# Patient Record
Sex: MALE | Race: WHITE
[De-identification: names, ages, dates, MRNs, and addresses within clinical notes are randomized per-mention and may not be internally consistent; named-entity substitution may affect disease eponyms.]

---

## 2018-09-27 ENCOUNTER — HOSPITAL ENCOUNTER (INPATIENT)
Dept: HOSPITAL 74 - YASAS | Age: 48
LOS: 4 days | Discharge: HOME | DRG: 897 | End: 2018-10-01
Attending: INTERNAL MEDICINE | Admitting: INTERNAL MEDICINE
Payer: COMMERCIAL

## 2018-09-27 VITALS — BODY MASS INDEX: 25.6 KG/M2

## 2018-09-27 DIAGNOSIS — F10.230: Primary | ICD-10-CM

## 2018-09-27 DIAGNOSIS — H15.89: ICD-10-CM

## 2018-09-27 DIAGNOSIS — I11.0: ICD-10-CM

## 2018-09-27 DIAGNOSIS — I50.9: ICD-10-CM

## 2018-09-27 DIAGNOSIS — R00.0: ICD-10-CM

## 2018-09-27 DIAGNOSIS — E72.20: ICD-10-CM

## 2018-09-27 DIAGNOSIS — G47.00: ICD-10-CM

## 2018-09-27 DIAGNOSIS — D64.9: ICD-10-CM

## 2018-09-27 DIAGNOSIS — F17.210: ICD-10-CM

## 2018-09-27 DIAGNOSIS — Z95.810: ICD-10-CM

## 2018-09-27 DIAGNOSIS — K80.20: ICD-10-CM

## 2018-09-27 DIAGNOSIS — K76.0: ICD-10-CM

## 2018-09-27 DIAGNOSIS — F10.24: ICD-10-CM

## 2018-09-27 PROCEDURE — HZ2ZZZZ DETOXIFICATION SERVICES FOR SUBSTANCE ABUSE TREATMENT: ICD-10-PCS | Performed by: INTERNAL MEDICINE

## 2018-09-27 RX ADMIN — SOTALOL HYDROCHLORIDE SCH MG: 80 TABLET ORAL at 22:24

## 2018-09-27 RX ADMIN — Medication SCH MG: at 22:24

## 2018-09-27 NOTE — PN
[facility] Progress Note


Note: 





 Vital Signs











Temperature  97.3 F L  09/27/18 19:17


 


Pulse Rate  131 H  09/27/18 19:17


 


Respiratory Rate  18   09/27/18 19:17


 


Blood Pressure  111/80   09/27/18 19:17


 


O2 Sat by Pulse Oximetry (%)      








c/o of nausea and vomiting 


zofran prn 


fluids as tolerated 


repeat v/s 


continue to monitor

## 2018-09-27 NOTE — HP
CIWA Score





- CIWA Score


Nausea/Vomitin-Cont. Nausea/Vomiting


Muscle Tremors: 4-Moderate,w/Arms Extend


Anxiety: 4-Mod. Anxious/Guarded


Agitation: 4-Moderately Restless


Paroxysmal Sweats: 1-Minimal Palms Moist (Moisture on face)


Orientation: 1-Uncertain about Date


Tacttile Disturbances: 0-None


Auditory Disturbances: 0-None


Visual Disturbances: 0-None


Headache: 0-None Present


CIWA-Ar Total Score: 21





Admission ROS S





- HPI


Chief Complaint: 





Here for alcohol withdrawal.


Allergies/Adverse Reactions: 


 Allergies











Allergy/AdvReac Type Severity Reaction Status Date / Time


 


No Known Allergies Allergy   Verified 18 15:04











History of Present Illness: 


First treatment episode for alcohol use for this 49 yo male.  Alcohol use began 

at age 13 and worsened since  till now drinks 1-2 pints daily. Last drink 

was 2 days ago. 








Seen in [facility] ER from    - 18. Patient was discharged and brought 

directly to [facility] by ambulette. 





Ethanol level: 405 (Rxd w/ IV Librium)


Bilirubin 2.9; Conjugated 2.0;


Alk: 491     ALT: 83        AST: 258


HGB: 9.1;       HCT:  27.3


PT/INR:    12.7/1.3  - Not on anticoagulants


Magnesium:  1.5mg/dl


Na: 130    K+: 3.4   Cl: 91;   





Dx: 


Cholelithiasis; Hepatic steatosis; 


Fatty liver  w/o cirrhosis;  


Systolic Heart Failure: (Internal defibrillator placed on 10/2015 which 

overlies the Left Heart)


Ventricular tachycardia


Hx pericardial effusion w/ cardiac tamponade. 





Current meds:


Lopressor 12.5 mg PO BID


Sotalol 80 mg PO Daily


Lisinoprol 2.5 mg PO Daily


Torsemide 20 mg for symptoms of overload (3x/wk)


Folic Acid 1 mg PO Daily


ASA 81 mg PO Daily


MVI  1 tab PO Daily


Thiamine 100 mg PO Daily


  





Exam Limitations: No Limitations





- Ebola screening


Have you traveled outside of the country in the last 21 days: No


Have you had contact with anyone from an Ebola affected area: No


Have you been sick,other than usual withdrawal symptoms: No


Do you have a fever: No





- Review of Systems


Constitutional: Chills


EENT: reports: Blurred Vision


Respiratory: reports: SOB with Exertion (When walking up stairs)


Cardiac: reports: Irregular Heart Rate, Other (Defibrillator. Denies chest pain.

)


GI: reports: Nausea, Tarry Stools (Tarry stools in past few months.)


: reports: No Symptoms Reported


Musculoskeletal: reports: Muscle Weakness (both legs)


Integumentary: reports: Rash (Posriasis on legs)


Endocrine: reports: No Symptoms Reported


Hematology: reports: Anemia


Psychiatric: reports: Judgement Intact, Agitated, Anxious, Depressed (Denies 

thoughts of harming self or others)





Patient History





- Patient Medical History


Hx Anemia: Yes


Hx Asthma: No


Hx Chronic Obstructive Pulmonary Disease (COPD): No


Hx Cardiac Disorders: Yes (pericardial effusion cardiac tamponade)


Hx Hypertension: Yes


Hx Hypercholesterolemia: No


Hx Pacemaker: Yes (Internal Defibrillator)


Hx Seizures: No


Hx Diabetes: No


Hx Gastrointestinal Disorders: Yes (Gallbladder problems)


Hx Liver Disease: Yes (fatty liver, )


Hx Genitourinary Disorders: No


Hx Sexually Transmitted Disorders: No (Denies)


Hx Renal Disease (ESRD): No


Hx Human Immunodeficiency Virus (HIV): No (Neg )


Hx Hepatitis C: No


Hx Depression: Yes


Hx Suicide Attempt: No


Hx Schizophrenia: No





- Patient Surgical History


Past Surgical History: Yes


Hx Neurologic Surgery: No


Hx Cataract Extraction: No


Hx Cardiac Surgery: Yes (defibrillator 10/2015)


Hx Lung Surgery: No


Hx Breast Surgery: No


Hx Breast Biopsy: No


Hx Abdominal Surgery: No


Hx Appendectomy: No


Hx Cholecystectomy: No


Hx Genitourinary Surgery: No


Hx  Section: No


Hx Orthopedic Surgery: No


Other Surgical History: Chest sx in 2017


Anesthesia Reaction: No





- PPD History


Previous Implant?: Yes


Documented Results: Negative w/o proof


Implanted On Prior [facility] Admission?: No





- Smoking Cessation


Smoking history: Current every day smoker


Have you smoked in the past 12 months: Yes


Aproximately how many cigarettes per day: 4


Hx Chewing Tobacco Use: No


Initiated information on smoking cessation: Yes


'Breaking Loose' booklet given: 18





- Substance & Tx. History


Hx Alcohol Use: Yes


Hx Substance Use: Yes


Substance Use Type: Alcohol


Hx Substance Use Treatment: No





- Substances Abused


  ** Alcohol


Route: Oral


Frequency: Daily


Amount used: 1-2 pints rum


Age of first use: 13


Date of Last Use: 18





Admission Physical Exam [facility]





- Vital Signs


Vital Signs: 


 Vital Signs - 24 hr











  18





  14:42


 


Temperature 97.5 F L


 


Pulse Rate 120 H


 


Respiratory 19





Rate 


 


Blood Pressure 110/76














- Physical


General Appearance: Yes: Appropriately Dressed, Moderate Distress, Tremorous, 

Sweating, Anxious


HEENTM: Yes: EOMI, SAMIA, Scleral Ictenus R (Slight jaundice sclera and 

conjunctiva), Scleral Ictenus L (Slight jaundice sclera and conjunctiva)


Respiratory: Yes: Chest Non-Tender, Lungs Clear, Normal Breath Sounds, No 

Respiratory Distress


Neck: Yes: No masses,lesions,Nodules, Supple


Breast: Yes: Breast Exam Deferred


Cardiology: Yes: Regular Rhythm, Murmur, Tachycardia, Surgical Scar, Other (

Internal defibrillator (L) chest area. Denies chest pain. No pedal edema.)


Abdominal: Yes: Non Tender, Soft, Increased Bowel Sounds


Genitourinary: Yes: Within Normal Limits


Back: Yes: Normal Inspection


Musculoskeletal: Yes: full range of Motion, Other (Gait slow and slightly 

insteady.)


Extremities: Yes: Normal Capillary Refill, Normal Range of Motion, Non-Tender, 

Tremors (Tremors of hands at rest and increases w/ arm elevation)


Integumentary: Yes: Normal Color, Warm, Rash (BLE at calf area.)


Lymphatic: Yes: Within Normal Limits





- Diagnostic


(1) Alcohol dependence with withdrawal


Current Visit: Yes   Status: Acute   


Qualifiers: 


   Complication of substance-induced condition: uncomplicated   Qualified Code(s

): F10.230 - Alcohol dependence with withdrawal, uncomplicated   





(2) Heart failure, unspecified


Current Visit: Yes   Status: Chronic   


Qualifiers: 


   Heart failure type: unspecified   Heart failure chronicity: unspecified   

Qualified Code(s): I50.9 - Heart failure, unspecified   





(3) Other disorders of sclera


Current Visit: Yes   Status: Chronic   Comment: Icteric sclera   





(4) Calculus of gallbladder without cholecystitis without obstruction


Current Visit: Yes   Status: Chronic   





(5) Presence of automatic (implantable) cardiac defibrillator


Current Visit: Yes   Status: Chronic   Comment: (L) chest area.    





(6) Tachycardia, unspecified


Current Visit: Yes   Status: Chronic   





Cleared for Admission [facility]





- Detox or Rehab


[facility] Level of Care: Medically Managed


Detox Regimen/Protocol: Librium





[facility] Breath Alcohol Content


Breath Alcohol Content: 0.100





Urine Drug Screen





- Results


Drug Screen Negative: No


Urine Drug Screen Results: BZO-Benzodiazepines

## 2018-09-28 LAB
ALBUMIN SERPL-MCNC: 2.1 G/DL (ref 3.4–5)
ALP SERPL-CCNC: 671 U/L (ref 45–117)
ALT SERPL-CCNC: 106 U/L (ref 13–61)
ANION GAP SERPL CALC-SCNC: 18 MMOL/L (ref 8–16)
AST SERPL-CCNC: 341 U/L (ref 15–37)
BILIRUB SERPL-MCNC: 3.1 MG/DL (ref 0.2–1)
BUN SERPL-MCNC: 3 MG/DL (ref 7–18)
CALCIUM SERPL-MCNC: 8.7 MG/DL (ref 8.5–10.1)
CHLORIDE SERPL-SCNC: 97 MMOL/L (ref 98–107)
CO2 SERPL-SCNC: 20 MMOL/L (ref 21–32)
CREAT SERPL-MCNC: 0.8 MG/DL (ref 0.55–1.3)
DEPRECATED RDW RBC AUTO: 15.2 % (ref 11.9–15.9)
GLUCOSE SERPL-MCNC: 119 MG/DL (ref 74–106)
HCT VFR BLD CALC: 31.3 % (ref 35.4–49)
HGB BLD-MCNC: 10.5 GM/DL (ref 11.7–16.9)
MCH RBC QN AUTO: 39.2 PG (ref 25.7–33.7)
MCHC RBC AUTO-ENTMCNC: 33.6 G/DL (ref 32–35.9)
MCV RBC: 116.6 FL (ref 80–96)
PLATELET # BLD AUTO: 197 K/MM3 (ref 134–434)
PMV BLD: 11.6 FL (ref 7.5–11.1)
POTASSIUM SERPLBLD-SCNC: 4.3 MMOL/L (ref 3.5–5.1)
PROT SERPL-MCNC: 8.8 G/DL (ref 6.4–8.2)
RBC # BLD AUTO: 2.69 M/MM3 (ref 4–5.6)
SODIUM SERPL-SCNC: 135 MMOL/L (ref 136–145)
WBC # BLD AUTO: 10.7 K/MM3 (ref 4–10)

## 2018-09-28 RX ADMIN — FOLIC ACID SCH MG: 1 TABLET ORAL at 10:32

## 2018-09-28 RX ADMIN — SOTALOL HYDROCHLORIDE SCH MG: 80 TABLET ORAL at 10:34

## 2018-09-28 RX ADMIN — ASPIRIN 81 MG SCH MG: 81 TABLET ORAL at 10:32

## 2018-09-28 RX ADMIN — Medication SCH TAB: at 10:33

## 2018-09-28 RX ADMIN — Medication SCH MG: at 22:36

## 2018-09-28 RX ADMIN — FERROUS SULFATE TAB EC 324 MG (65 MG FE EQUIVALENT) SCH MG: 324 (65 FE) TABLET DELAYED RESPONSE at 08:58

## 2018-09-28 RX ADMIN — LACTULOSE SCH GM: 20 SOLUTION ORAL at 22:35

## 2018-09-28 RX ADMIN — SOTALOL HYDROCHLORIDE SCH: 80 TABLET ORAL at 23:09

## 2018-09-28 RX ADMIN — FERROUS SULFATE TAB EC 324 MG (65 MG FE EQUIVALENT) SCH MG: 324 (65 FE) TABLET DELAYED RESPONSE at 18:13

## 2018-09-28 NOTE — CONSULT
[facility] Psychiatric Consult





- Data


Date of interview: 09/28/18


Admission source: [facility]


Identifying data: Patient is a 48 year old  male, father of three, 

domiciled, and employed. This is patient's first admission to detox at [facility]. Pt. admitted to  for alcohol dependence.


Substance Abuse History: Smoking Cessation.  Smoking history: Current every day 

smoker.  Have you smoked in the past 12 months: Yes.  Aproximately how many 

cigarettes per day: 4.  Hx Chewing Tobacco Use: No.  Initiated information on 

smoking cessation: Yes.  'Breaking Loose' booklet given: 09/27/18.  - Substance 

& Tx. History.  Hx Alcohol Use: Yes.  Hx Substance Use: Yes.  Substance Use Type

: Alcohol.  Hx Substance Use Treatment: No.  - Substances Abused.  ** Alcohol.  

Route: Oral.  Frequency: Daily.  Amount used: 1-2 pints rum.  Age of first use: 

13.  Date of Last Use: 09/25/18


Medical History: Anemia, pericardial effusion cardiac tamponade, fatty liver, 

Chest surgery (2017), Pace maker (Internal Defibrillator)


Psychiatric History: Patient denies h/o psychiatric hospitalization, outpatient 

care, and suicide attempt. Patient reports poor sleep.


Physical/Sexual Abuse/Trauma History: denies.





Mental Status Exam





- Mental Status Exam


Alert and Oriented to: Time, Place, Person


Cognitive Function: Good


Patient Appearance: Well Groomed


Mood: Euthymic


Affect: Mood Congruent


Patient Behavior: Fatigued, Cooperative


Speech Pattern: Appropriate


Voice Loudness: Moderately Soft/Quiet


Thought Process: Intact, Goal Oriented


Thought Disorder: Not Present


Hallucinations: Denies


Suicidal Ideation: Denies


Homicidal Ideation: Denies


Insight/Judgement: Poor


Sleep: Fair


Appetite: Fair


Muscle strength/Tone: Normal


Gait/Station: Normal





Psychiatric Findings





- Problem List (Axis 1, 2,3)


(1) Alcohol-induced mood disorder


Current Visit: Yes   Status: Acute   





(2) Insomnia


Current Visit: Yes   Status: Acute   





(3) Alcohol dependence with withdrawal


Current Visit: Yes   Status: Acute   


Qualifiers: 


   Complication of substance-induced condition: uncomplicated   Qualified Code(s

): F10.230 - Alcohol dependence with withdrawal, uncomplicated   





- Initial Treatment Plan


Initial Treatment Plan: Psychoeducation provided. Detoxification in progress. 

Pt. made aware that melatonin 5mg is ordered for insomnia.

## 2018-09-28 NOTE — EKG
Test Reason : 

Blood Pressure : ***/*** mmHG

Vent. Rate : 106 BPM     Atrial Rate : 106 BPM

   P-R Int : 124 ms          QRS Dur : 080 ms

    QT Int : 342 ms       P-R-T Axes : 035 -11 052 degrees

   QTc Int : 454 ms

 

*** POOR DATA QUALITY, INTERPRETATION MAY BE ADVERSELY AFFECTED

SINUS TACHYCARDIA

OTHERWISE NORMAL ECG

NO PREVIOUS ECGS AVAILABLE

Confirmed by MARGARITA IRIZARRY MD ([id number]) on 9/28/2018 11:14:43 AM

 

Referred By:             Confirmed By:MARGARITA IRIZARRY MD

## 2018-09-28 NOTE — PN
S CIWA





- CIWA Score


Nausea/Vomitin-No Nausea/No Vomiting


Muscle Tremors: 4-Moderate,w/Arms Extend


Anxiety: 3


Agitation: 3


Paroxysmal Sweats: 3


Orientation: 0-Oriented


Tacttile Disturbances: 0-None


Auditory Disturbances: 0-None


Visual Disturbances: 0-None


Headache: 0-None Present


CIWA-Ar Total Score: 13





[facility] Progress Note (SOAP)


Subjective: 





sweats


irritable


agitation


shakes


interrupted sleep


Objective: 





18 11:06


 Vital Signs











Temperature  98.2 F   18 09:24


 


Pulse Rate  108 H  18 09:24


 


Respiratory Rate  18 09:24


 


Blood Pressure  129/58 L  18 09:24


 


O2 Sat by Pulse Oximetry (%)      








 Laboratory Tests











  18





  06:00 06:00 07:00


 


WBC  10.7 H  


 


RBC  2.69 L  


 


Hgb  10.5 L  


 


Hct  31.3 L  


 


MCV  116.6 H  


 


MCH  39.2 H  


 


MCHC  33.6  


 


RDW  15.2  


 


Plt Count  197  


 


MPV  11.6 H  


 


Sodium   135 L 


 


Potassium   4.3 


 


Chloride   97 L 


 


Carbon Dioxide   20 L 


 


Anion Gap   18 H 


 


BUN   3 L 


 


Creatinine   0.8 


 


Creat Clearance w eGFR   > 60 


 


Random Glucose   119 H 


 


Calcium   8.7 


 


Total Bilirubin   3.1 H 


 


AST   341 H 


 


ALT   106 H 


 


Alkaline Phosphatase   671 H 


 


Ammonia    85.12 H


 


Total Protein   8.8 H 


 


Albumin   2.1 L 








elevated ast/alt/ammonia level


pt is chonic cirrohosis of liver


laculose ordered


repeated labs for .


Assessment: 





18 11:10


withdrawal sx


Plan: 





continue detox


increase fluids


repeated labs pending

## 2018-09-28 NOTE — PN
[facility] Progress Note (SOAP)


Subjective: 





States feels okay. C/o being cold. States having watery diarrhea but is aware it

's r/t medications.


Objective: 


A&Ox 3. Lungs CTA. Abd S/NT.  No pedal edema. 





 Vital Signs - 24 hr











  09/27/18 09/28/18 09/28/18





  22:15 00:30 03:30


 


Temperature 97.7 F  


 


Pulse Rate 128 H  


 


Respiratory 18 18 18





Rate   


 


Blood Pressure 101/77  














  09/28/18 09/28/18 09/28/18





  05:49 09:24 14:29


 


Temperature 98.2 F 98.2 F 96.6 F L


 


Pulse Rate 72 108 H 96 H


 


Respiratory 18 18 16





Rate   


 


Blood Pressure 99/73 129/58 L 90/62











Current B/P: 88/62   HR:84


 CMP











Sodium  135 mmol/L (136-145)  L  09/28/18  06:00    


 


Potassium  4.3 mmol/L (3.5-5.1)   09/28/18  06:00    


 


Chloride  97 mmol/L ()  L  09/28/18  06:00    


 


Carbon Dioxide  20 mmol/L (21-32)  L  09/28/18  06:00    


 


Anion Gap  18 MMOL/L (8-16)  H  09/28/18  06:00    


 


BUN  3 mg/dL (7-18)  L  09/28/18  06:00    


 


Creatinine  0.8 mg/dL (0.55-1.3)   09/28/18  06:00    


 


Creat Clearance w eGFR  > 60  (>60)   09/28/18  06:00    


 


Random Glucose  119 mg/dL ()  H  09/28/18  06:00    


 


Calcium  8.7 mg/dL (8.5-10.1)   09/28/18  06:00    


 


Total Bilirubin  3.1 mg/dL (0.2-1)  H  09/28/18  06:00    


 


AST  341 U/L (15-37)  H  09/28/18  06:00    


 


ALT  106 U/L (13-61)  H  09/28/18  06:00    


 


Alkaline Phosphatase  671 U/L ()  H  09/28/18  06:00    


 


Ammonia  85.12 umol/L (11-32)  H  09/28/18  07:00    


 


Total Protein  8.8 g/dl (6.4-8.2)  H  09/28/18  06:00    


 


Albumin  2.1 g/dl (3.4-5.0)  L  09/28/18  06:00    








Labs reviewed. 


09/28/18 21:48





Assessment: 


Withdrawal symptoms.


Unstableness of B/P


Increased serum ammonia level


Plan: 


Continue detox w/ adjustment to Librium dose as needed.


Encourage increased water intake


Continue lactulose

## 2018-09-29 RX ADMIN — Medication SCH MG: at 22:20

## 2018-09-29 RX ADMIN — FERROUS SULFATE TAB EC 324 MG (65 MG FE EQUIVALENT) SCH: 324 (65 FE) TABLET DELAYED RESPONSE at 10:14

## 2018-09-29 RX ADMIN — LACTULOSE SCH: 20 SOLUTION ORAL at 22:22

## 2018-09-29 RX ADMIN — SOTALOL HYDROCHLORIDE SCH MG: 80 TABLET ORAL at 10:14

## 2018-09-29 RX ADMIN — ASPIRIN 81 MG SCH MG: 81 TABLET ORAL at 10:14

## 2018-09-29 RX ADMIN — FOLIC ACID SCH MG: 1 TABLET ORAL at 10:14

## 2018-09-29 RX ADMIN — SOTALOL HYDROCHLORIDE SCH MG: 80 TABLET ORAL at 22:20

## 2018-09-29 RX ADMIN — FERROUS SULFATE TAB EC 324 MG (65 MG FE EQUIVALENT) SCH MG: 324 (65 FE) TABLET DELAYED RESPONSE at 18:19

## 2018-09-29 RX ADMIN — LACTULOSE SCH GM: 20 SOLUTION ORAL at 10:17

## 2018-09-29 RX ADMIN — Medication SCH TAB: at 10:15

## 2018-09-29 NOTE — PN
[facility] CIWA





- CIWA Score


Nausea/Vomitin-No Nausea/No Vomiting


Muscle Tremors: 3


Anxiety: 3


Agitation: 3


Paroxysmal Sweats: 1-Minimal Palms Moist


Orientation: 0-Oriented


Tacttile Disturbances: 0-None


Auditory Disturbances: 0-None


Visual Disturbances: 0-None


Headache: 0-None Present


CIWA-Ar Total Score: 10





[facility] Progress Note (SOAP)


Subjective: 





tremors, chills, interrupted sleep, anxious


Objective: 





18 13:12


 Vital Signs











Temperature  98.2 F   18 09:00


 


Pulse Rate  124 H  18 09:00


 


Respiratory Rate  20   18 09:00


 


Blood Pressure  90/52 L  18 09:00


 


O2 Sat by Pulse Oximetry (%)      








 Laboratory Last Values











WBC  10.7 K/mm3 (4.0-10.0)  H  18  06:00    


 


RBC  2.69 M/mm3 (4.00-5.60)  L  18  06:00    


 


Hgb  10.5 GM/dL (11.7-16.9)  L  18  06:00    


 


Hct  31.3 % (35.4-49)  L  18  06:00    


 


MCV  116.6 fl (80-96)  H  18  06:00    


 


MCH  39.2 pg (25.7-33.7)  H  18  06:00    


 


MCHC  33.6 g/dl (32.0-35.9)   18  06:00    


 


RDW  15.2 % (11.9-15.9)   18  06:00    


 


Plt Count  197 K/MM3 (134-434)   18  06:00    


 


MPV  11.6 fl (7.5-11.1)  H  18  06:00    


 


Sodium  135 mmol/L (136-145)  L  18  06:00    


 


Potassium  4.3 mmol/L (3.5-5.1)   18  06:00    


 


Chloride  97 mmol/L ()  L  18  06:00    


 


Carbon Dioxide  20 mmol/L (21-32)  L  18  06:00    


 


Anion Gap  18 MMOL/L (8-16)  H  18  06:00    


 


BUN  3 mg/dL (7-18)  L  18  06:00    


 


Creatinine  0.8 mg/dL (0.55-1.3)   18  06:00    


 


Creat Clearance w eGFR  > 60  (>60)   18  06:00    


 


Random Glucose  119 mg/dL ()  H  18  06:00    


 


Calcium  8.7 mg/dL (8.5-10.1)   18  06:00    


 


Total Bilirubin  3.1 mg/dL (0.2-1)  H  18  06:00    


 


AST  341 U/L (15-37)  H  18  06:00    


 


ALT  106 U/L (13-61)  H  18  06:00    


 


Alkaline Phosphatase  671 U/L ()  H  18  06:00    


 


Ammonia  85.12 umol/L (11-32)  H  18  07:00    


 


Total Protein  8.8 g/dl (6.4-8.2)  H  18  06:00    


 


Albumin  2.1 g/dl (3.4-5.0)  L  18  06:00    


 


RPR Titer  Nonreactive  (NONREACTIVE)   18  06:00    








repeat CMP and CBC results pending 





AOx3 no distress, anxious 


no adventitious breath sounds 


full ROM ambulating in the unit 





Assessment: 





18 13:14


withdrawal sx 


Plan: 





labs pending 


increase po fluids 


continue detox 


continue to monitor

## 2018-09-30 LAB
ALBUMIN SERPL-MCNC: 1.8 G/DL (ref 3.4–5)
ALP SERPL-CCNC: 490 U/L (ref 45–117)
ALT SERPL-CCNC: 59 U/L (ref 13–61)
ANION GAP SERPL CALC-SCNC: 9 MMOL/L (ref 8–16)
ANISOCYTOSIS BLD QL: (no result)
AST SERPL-CCNC: 187 U/L (ref 15–37)
BILIRUB SERPL-MCNC: 3.3 MG/DL (ref 0.2–1)
BUN SERPL-MCNC: 9 MG/DL (ref 7–18)
CALCIUM SERPL-MCNC: 8 MG/DL (ref 8.5–10.1)
CHLORIDE SERPL-SCNC: 95 MMOL/L (ref 98–107)
CO2 SERPL-SCNC: 30 MMOL/L (ref 21–32)
CREAT SERPL-MCNC: 1.4 MG/DL (ref 0.55–1.3)
DEPRECATED RDW RBC AUTO: 14.8 % (ref 11.9–15.9)
GLUCOSE SERPL-MCNC: 83 MG/DL (ref 74–106)
HCT VFR BLD CALC: 29.1 % (ref 35.4–49)
HGB BLD-MCNC: 9.8 GM/DL (ref 11.7–16.9)
MACROCYTES BLD QL: (no result)
MCH RBC QN AUTO: 38.2 PG (ref 25.7–33.7)
MCHC RBC AUTO-ENTMCNC: 33.5 G/DL (ref 32–35.9)
MCV RBC: 114 FL (ref 80–96)
PLATELET # BLD AUTO: 112 K/MM3 (ref 134–434)
PLATELET BLD QL SMEAR: (no result)
PMV BLD: 11.2 FL (ref 7.5–11.1)
POTASSIUM SERPLBLD-SCNC: 3.6 MMOL/L (ref 3.5–5.1)
PROT SERPL-MCNC: 7.4 G/DL (ref 6.4–8.2)
RBC # BLD AUTO: 2.56 M/MM3 (ref 4–5.6)
SODIUM SERPL-SCNC: 134 MMOL/L (ref 136–145)
TARGETS BLD QL SMEAR: (no result)
WBC # BLD AUTO: 8.3 K/MM3 (ref 4–10)

## 2018-09-30 RX ADMIN — Medication SCH: at 22:40

## 2018-09-30 RX ADMIN — Medication SCH TAB: at 10:09

## 2018-09-30 RX ADMIN — LACTULOSE SCH GM: 20 SOLUTION ORAL at 10:11

## 2018-09-30 RX ADMIN — SOTALOL HYDROCHLORIDE SCH: 80 TABLET ORAL at 22:39

## 2018-09-30 RX ADMIN — FOLIC ACID SCH MG: 1 TABLET ORAL at 10:09

## 2018-09-30 RX ADMIN — SOTALOL HYDROCHLORIDE SCH MG: 80 TABLET ORAL at 10:10

## 2018-09-30 RX ADMIN — FERROUS SULFATE TAB EC 324 MG (65 MG FE EQUIVALENT) SCH MG: 324 (65 FE) TABLET DELAYED RESPONSE at 07:59

## 2018-09-30 RX ADMIN — ASPIRIN 81 MG SCH MG: 81 TABLET ORAL at 10:09

## 2018-09-30 RX ADMIN — FERROUS SULFATE TAB EC 324 MG (65 MG FE EQUIVALENT) SCH MG: 324 (65 FE) TABLET DELAYED RESPONSE at 17:59

## 2018-09-30 NOTE — PN
[facility] Progress Note (SOAP)


Subjective: 





alert feeling better social with peers in day room no tremor no gi distress


Objective: 





09/30/18 14:26


 Vital Signs











Temperature  97.9 F   09/30/18 13:57


 


Pulse Rate  85   09/30/18 13:57


 


Respiratory Rate  16   09/30/18 13:57


 


Blood Pressure  122/59 L  09/30/18 13:57


 


O2 Sat by Pulse Oximetry (%)      








 Laboratory Last Values











WBC  8.3 K/mm3 (4.0-10.0)   09/30/18  07:49    


 


RBC  2.56 M/mm3 (4.00-5.60)  L  09/30/18  07:49    


 


Hgb  9.8 GM/dL (11.7-16.9)  L  09/30/18  07:49    


 


Hct  29.1 % (35.4-49)  L  09/30/18  07:49    


 


MCV  114.0 fl (80-96)  H  09/30/18  07:49    


 


MCH  38.2 pg (25.7-33.7)  H  09/30/18  07:49    


 


MCHC  33.5 g/dl (32.0-35.9)   09/30/18  07:49    


 


RDW  14.8 % (11.9-15.9)   09/30/18  07:49    


 


Plt Count  112 K/MM3 (134-434)  L D 09/30/18  07:49    


 


MPV  11.2 fl (7.5-11.1)  H  09/30/18  07:49    


 


Absolute Neuts (auto)  6.1 K/mm3 (1.5-8.0)   09/30/18  07:49    


 


Total Counted  100   09/30/18  07:49    


 


Neutrophils %  No Result Required.   09/30/18  07:49    


 


Neutrophils % (Manual)  74.0 % (42.8-82.8)   09/30/18  07:49    


 


Band Neutrophils %  1.0 %  09/30/18  07:49    


 


Lymphocytes %  No Result Required.   09/30/18  07:49    


 


Lymphocytes % (Manual)  16.0 % (8-40)   09/30/18  07:49    


 


Monocytes % (Manual)  6 % (3.8-10.2)   09/30/18  07:49    


 


Eosinophils % (Manual)  3.0 % (0-4.5)   09/30/18  07:49    


 


Nucleated RBC %  0 % (0-0)   09/30/18  07:49    


 


Hypochromia  1+   09/30/18  07:49    


 


Platelet Estimate  Decreased   09/30/18  07:49    


 


Platelet Comment  No clumping noted   09/30/18  07:49    


 


Polychromasia  1+   09/30/18  07:49    


 


Poikilocytosis  1+   09/30/18  07:49    


 


Anisocytosis  2+   09/30/18  07:49    


 


Macrocytosis  2+   09/30/18  07:49    


 


Target Cells  2+   09/30/18  07:49    


 


Sodium  134 mmol/L (136-145)  L  09/30/18  07:49    


 


Potassium  3.6 mmol/L (3.5-5.1)   09/30/18  07:49    


 


Chloride  95 mmol/L ()  L  09/30/18  07:49    


 


Carbon Dioxide  30 mmol/L (21-32)   09/30/18  07:49    


 


Anion Gap  9 MMOL/L (8-16)   09/30/18  07:49    


 


BUN  9 mg/dL (7-18)   09/30/18  07:49    


 


Creatinine  1.4 mg/dL (0.55-1.3)  H  09/30/18  07:49    


 


Creat Clearance w eGFR  53.64  (>60)   09/30/18  07:49    


 


Random Glucose  83 mg/dL ()   09/30/18  07:49    


 


Calcium  8.0 mg/dL (8.5-10.1)  L  09/30/18  07:49    


 


Total Bilirubin  3.3 mg/dL (0.2-1)  H  09/30/18  07:49    


 


AST  187 U/L (15-37)  H  09/30/18  07:49    


 


ALT  59 U/L (13-61)   09/30/18  07:49    


 


Alkaline Phosphatase  490 U/L ()  H  09/30/18  07:49    


 


Ammonia  85.12 umol/L (11-32)  H  09/28/18  07:00    


 


Total Protein  7.4 g/dl (6.4-8.2)   09/30/18  07:49    


 


Albumin  1.8 g/dl (3.4-5.0)  L  09/30/18  07:49    


 


RPR Titer  Nonreactive  (NONREACTIVE)   09/28/18  06:00    








lab noted 


repeat ast


continue lactulose 


repeat ammonia


Assessment: 





09/30/18 14:27


mild withdrawal sx


Plan: 





medically supervised detox

## 2018-10-01 VITALS — HEART RATE: 102 BPM | SYSTOLIC BLOOD PRESSURE: 84 MMHG | TEMPERATURE: 96.6 F | DIASTOLIC BLOOD PRESSURE: 56 MMHG

## 2018-10-01 RX ADMIN — Medication SCH: at 09:31

## 2018-10-01 RX ADMIN — SOTALOL HYDROCHLORIDE SCH: 80 TABLET ORAL at 09:31

## 2018-10-01 RX ADMIN — ASPIRIN 81 MG SCH: 81 TABLET ORAL at 09:31

## 2018-10-01 RX ADMIN — FOLIC ACID SCH: 1 TABLET ORAL at 09:31

## 2018-10-01 NOTE — DS
[facility] Detox Discharge Summary


Admission Date: 


09/27/18





Discharge Date: 10/01/18





- History


Present History: Alcohol Dependence


Additional Comments: 





48 years old male admitted on 9/27/18 for alcohol withdrawal sx


completed alcohol detox regimen tolerated well denies alcohol withdrawal sx


alert oriented x 3 no acute distress aftercare [facility] out patient





- Physical Exam Results


Vital Signs: 


 Vital Signs











Temperature  96.6 F L  10/01/18 09:14


 


Pulse Rate  102 H  10/01/18 09:14


 


Respiratory Rate  20   10/01/18 09:14


 


Blood Pressure  84/56 L  10/01/18 09:14


 


O2 Sat by Pulse Oximetry (%)      











Pertinent Admission Physical Exam Findings: 





alcohol withdrawal sx


 Vital Signs











Temperature  96.6 F L  10/01/18 09:14


 


Pulse Rate  102 H  10/01/18 09:14


 


Respiratory Rate  20   10/01/18 09:14


 


Blood Pressure  84/56 L  10/01/18 09:14


 


O2 Sat by Pulse Oximetry (%)      








 Laboratory Last Values











WBC  8.3 K/mm3 (4.0-10.0)   09/30/18  07:49    


 


RBC  2.56 M/mm3 (4.00-5.60)  L  09/30/18  07:49    


 


Hgb  9.8 GM/dL (11.7-16.9)  L  09/30/18  07:49    


 


Hct  29.1 % (35.4-49)  L  09/30/18  07:49    


 


MCV  114.0 fl (80-96)  H  09/30/18  07:49    


 


MCH  38.2 pg (25.7-33.7)  H  09/30/18  07:49    


 


MCHC  33.5 g/dl (32.0-35.9)   09/30/18  07:49    


 


RDW  14.8 % (11.9-15.9)   09/30/18  07:49    


 


Plt Count  112 K/MM3 (134-434)  L D 09/30/18  07:49    


 


MPV  11.2 fl (7.5-11.1)  H  09/30/18  07:49    


 


Absolute Neuts (auto)  6.1 K/mm3 (1.5-8.0)   09/30/18  07:49    


 


Total Counted  100   09/30/18  07:49    


 


Neutrophils %  No Result Required.   09/30/18  07:49    


 


Neutrophils % (Manual)  74.0 % (42.8-82.8)   09/30/18  07:49    


 


Band Neutrophils %  1.0 %  09/30/18  07:49    


 


Lymphocytes %  No Result Required.   09/30/18  07:49    


 


Lymphocytes % (Manual)  16.0 % (8-40)   09/30/18  07:49    


 


Monocytes % (Manual)  6 % (3.8-10.2)   09/30/18  07:49    


 


Eosinophils % (Manual)  3.0 % (0-4.5)   09/30/18  07:49    


 


Nucleated RBC %  0 % (0-0)   09/30/18  07:49    


 


Hypochromia  1+   09/30/18  07:49    


 


Platelet Estimate  Decreased   09/30/18  07:49    


 


Platelet Comment  No clumping noted   09/30/18  07:49    


 


Polychromasia  1+   09/30/18  07:49    


 


Poikilocytosis  1+   09/30/18  07:49    


 


Anisocytosis  2+   09/30/18  07:49    


 


Macrocytosis  2+   09/30/18  07:49    


 


Target Cells  2+   09/30/18  07:49    


 


Sodium  134 mmol/L (136-145)  L  09/30/18  07:49    


 


Potassium  3.6 mmol/L (3.5-5.1)   09/30/18  07:49    


 


Chloride  95 mmol/L ()  L  09/30/18  07:49    


 


Carbon Dioxide  30 mmol/L (21-32)   09/30/18  07:49    


 


Anion Gap  9 MMOL/L (8-16)   09/30/18  07:49    


 


BUN  9 mg/dL (7-18)   09/30/18  07:49    


 


Creatinine  1.4 mg/dL (0.55-1.3)  H  09/30/18  07:49    


 


Creat Clearance w eGFR  53.64  (>60)   09/30/18  07:49    


 


Random Glucose  83 mg/dL ()   09/30/18  07:49    


 


Calcium  8.0 mg/dL (8.5-10.1)  L  09/30/18  07:49    


 


Total Bilirubin  3.3 mg/dL (0.2-1)  H  09/30/18  07:49    


 


AST  187 U/L (15-37)  H  09/30/18  07:49    


 


ALT  59 U/L (13-61)   09/30/18  07:49    


 


Alkaline Phosphatase  490 U/L ()  H  09/30/18  07:49    


 


Ammonia  85.12 umol/L (11-32)  H  09/28/18  07:00    


 


Total Protein  7.4 g/dl (6.4-8.2)   09/30/18  07:49    


 


Albumin  1.8 g/dl (3.4-5.0)  L  09/30/18  07:49    


 


RPR Titer  Nonreactive  (NONREACTIVE)   09/28/18  06:00    








lab noted


patient agrees to follow up ammonia level at [facility] program as well as  level of 

RBC





- Treatment


Hospital Course: Detox Protocol Followed, Detoxed Safely, Responded well, 

Discharged Condition Good, Rehab Referral Accepted


Patient has Accepted a Rehab Referral to: [facility] out [facility]





- Medication


Discharge Medications: 


Ambulatory Orders





Aspirin [ASA -] 81 mg PO DAILY 09/27/18 


Folic Acid - 1 mg PO DAILY 09/27/18 


Multivitamins [Multivit ([facility])] 1 tab PO DAILY 09/27/18 


Pantoprazole Sodium [Protonix -] 40 mg PO DAILY 09/27/18 


Sotalol HCl [Betapace -] 80 mg PO BID 09/27/18 


Thiamine HCl [Vitamin B1 -] 100 mg PO DAILY 09/27/18 


Torsemide 20 mg PO ASDIR 09/27/18 


Atorvastatin Ca [Lipitor] 40 mg PO HS #30 tablet 09/30/18 


Lisinopril [Zestril] 2.5 mg PO DAILY #30 tablet 09/30/18 


Spironolactone [Aldactone -] 50 mg PO DAILY #30 tablet 09/30/18 


Lactulose (Oral Use) [Cephulac -] 20 gm PO BID #1 udc 10/01/18 











- Diagnosis


(1) Alcohol-induced mood disorder


Status: Acute   





(2) Heart failure, unspecified


Status: Chronic   


Qualifiers: 


   Heart failure type: unspecified   Heart failure chronicity: unspecified   

Qualified Code(s): I50.9 - Heart failure, unspecified   





- AMA


Did Patient Leave Against Medical Advice: No